# Patient Record
Sex: FEMALE | Race: WHITE
[De-identification: names, ages, dates, MRNs, and addresses within clinical notes are randomized per-mention and may not be internally consistent; named-entity substitution may affect disease eponyms.]

---

## 2018-10-30 ENCOUNTER — HOSPITAL ENCOUNTER (EMERGENCY)
Dept: HOSPITAL 80 - FED | Age: 63
Discharge: HOME | End: 2018-10-30
Payer: COMMERCIAL

## 2018-10-30 VITALS — DIASTOLIC BLOOD PRESSURE: 80 MMHG | SYSTOLIC BLOOD PRESSURE: 113 MMHG

## 2018-10-30 DIAGNOSIS — R07.89: Primary | ICD-10-CM

## 2018-10-30 LAB — PLATELET # BLD: 263 10^3/UL (ref 150–400)

## 2018-10-30 NOTE — CPEKG
Test Reason : OPEN

Blood Pressure : ***/*** mmHG

Vent. Rate : 098 BPM     Atrial Rate : 098 BPM

   P-R Int : 146 ms          QRS Dur : 075 ms

    QT Int : 346 ms       P-R-T Axes : 075 064 059 degrees

   QTc Int : 442 ms

 

Sinus rhythm

 

 

Confirmed by Albert Henley ([id number]) on 10/30/2018 2:35:47 PM

 

Referred By:             Confirmed By:Albert Henley

## 2018-10-30 NOTE — EDPHY
H & P


Time Seen by Provider: 10/30/18 14:09


HPI/ROS: 





CHIEF COMPLAINT:  Right-sided chest pain





HISTORY OF PRESENT ILLNESS:  63-year-old woman presents with right-sided chest 

pain starting this morning.  Located over her breast, worse with deep breath 

and worse lying down.  Not associated with coughing or leg swelling or fever or 

chills.  No recent injury or trauma.





Of note she has been seeing [facility] for bronchiectasis which was 

diagnosed when she had a persistent cough 2 years ago in [place].  She also 

started using albuterol saline nebs this past Saturday.  She also just returned 

from a trip to [place] this last week on Thursday





REVIEW OF SYSTEMS:


Eye: no change in vision


ENT: no sore throat


Cardiac:  HPI


Pulmonary: no cough or SOB


Abdomen: no vomiting, diarrhea, abdominal pain


Musculoskeletal:  No leg swelling


Skin: no rash


Neuro: no headache


Constitutional: no fever


: no urinary symptoms





A comprehensive 10 point review of systems is otherwise negative aside from 

elements mentioned in the history of present illness.





PAST MEDICAL HISTORY:  Bronchiectasis





Social history:  Nonsmoker





General Appearance: Alert and conversant, cooperative.


Eyes: No scleral  icterus. 


ENT, Mouth: Normal mucous membranes.


Respiratory: Normal respiratory effort, breath sounds equal, lungs are clear to 

auscultation.


Cardiovascular:  Regular rate and rhythm.


Gastrointestinal:  Abdomen is soft and non tender.


Neurological: Alert, face symmetric, normal motor and sensory in extremities. 


Skin: Warm and dry, no rashes.


Musculoskeletal:  No calf tenderness.


Psychiatric: Not agitated.





Emergency Department course/MDM:





Patient is noted to be tachycardic at a rate of 108. Plan for EKG and D-dimer, 

chest imaging:  CTA if elevated, x-ray if D-dimer negative.





Unlikely to be ACS or dissection or pneumonia.





1450:  The D-dimer negative, troponin negative.  Pulmonary embolism unlikely at 

this time, chest x-ray and discharge if negative with symptomatic treatment.


1520:  Chest x-ray shows granulomatous disease which is known previously, but 

no pneumothorax or other reason for acute right-sided chest pain, symptomatic 

treatment stable for discharge which the patient says she is comfortable with.


Smoking Status: Never smoked


Constitutional: 


 Initial Vital Signs











Temperature (C)  36.7 C   10/30/18 14:05


 


Heart Rate  108 H  10/30/18 14:05


 


Respiratory Rate  18   10/30/18 14:05


 


Blood Pressure  132/90 H  10/30/18 14:05


 


O2 Sat (%)  98   10/30/18 14:05








 











O2 Delivery Mode               Room Air














Allergies/Adverse Reactions: 


 





cefaclor [From Ceclor] Allergy (Verified 10/30/18 14:02)


 








Home Medications: 














 Medication  Instructions  Recorded


 


Albuterol  10/30/18


 


Baclofen  10/30/18


 


Estradiol  10/30/18


 


SUMAtriptan  10/30/18














Medical Decision Making





- Diagnostics


EKG Interpretation: 





12-lead EKG interpreted by me; official reading is in computer system.  My 

interpretation is sinus rhythm rate 99, no acute ST changes 


Imaging Results: 


 Imaging Impressions





Chest X-Ray  10/30/18 14:48


Impression: Focal infiltrates right mid to lower lung. In this patient with 

history of granulomatous disease, it is difficult to determine if these are 

acute versus chronic lesions. If indicated, would be helpful to obtain prior 

chest x-ray or CT chest imaging for comparison.


 











Imaging: I viewed and interpreted images myself


Differential Diagnosis: 





Differential diagnosis considered for chest pain including but not limited to 

myocardial ischemia, aortic dissection, pericarditis, pulmonary embolus, chest 

wall pain, pleural inflammation and pulmonary infectious causes.





- Data Points


Laboratory Results: 


 Laboratory Results





 10/30/18 14:25 





 10/30/18 14:25 





 











  10/30/18 10/30/18 10/30/18





  14:29 14:25 14:25


 


WBC      





    


 


RBC      





    


 


Hgb      





    


 


Hct      





    


 


MCV      





    


 


MCH      





    


 


MCHC      





    


 


RDW      





    


 


Plt Count      





    


 


MPV      





    


 


Neut % (Auto)      





    


 


Lymph % (Auto)      





    


 


Mono % (Auto)      





    


 


Eos % (Auto)      





    


 


Baso % (Auto)      





    


 


Nucleat RBC Rel Count      





    


 


Absolute Neuts (auto)      





    


 


Absolute Lymphs (auto)      





    


 


Absolute Monos (auto)      





    


 


Absolute Eos (auto)      





    


 


Absolute Basos (auto)      





    


 


Absolute Nucleated RBC      





    


 


Immature Gran %      





    


 


Immature Gran #      





    


 


D-Dimer      < 0.27 ug/mLFEU ug/mLFEU





     (0.00-0.50) 


 


Sodium    140 mEq/L mEq/L  





    (135-145)  


 


Potassium    3.9 mEq/L mEq/L  





    (3.3-5.0)  


 


Chloride    102 mEq/L mEq/L  





    ()  


 


Carbon Dioxide    29 mEq/l mEq/l  





    (22-31)  


 


Anion Gap    9 mEq/L mEq/L  





    (6-14)  


 


BUN    13 mg/dL mg/dL  





    (7-23)  


 


Creatinine    0.6 mg/dL mg/dL  





    (0.6-1.0)  


 


Estimated GFR    > 60   





    


 


Glucose    98 mg/dL mg/dL  





    ()  


 


Calcium    9.4 mg/dL mg/dL  





    (8.5-10.4)  


 


POC Troponin I  0.00 ng/mL ng/mL    





   (0.00-0.08)   














  10/30/18





  14:25


 


WBC  7.76 10^3/uL 10^3/uL





   (3.80-9.50) 


 


RBC  5.04 10^6/uL 10^6/uL





   (4.18-5.33) 


 


Hgb  14.4 g/dL g/dL





   (12.6-16.3) 


 


Hct  43.6 % %





   (38.0-47.0) 


 


MCV  86.5 fL fL





   (81.5-99.8) 


 


MCH  28.6 pg pg





   (27.9-34.1) 


 


MCHC  33.0 g/dL g/dL





   (32.4-36.7) 


 


RDW  13.2 % %





   (11.5-15.2) 


 


Plt Count  263 10^3/uL 10^3/uL





   (150-400) 


 


MPV  9.2 fL fL





   (8.7-11.7) 


 


Neut % (Auto)  69.3 % %





   (39.3-74.2) 


 


Lymph % (Auto)  18.4 % %





   (15.0-45.0) 


 


Mono % (Auto)  9.0 % %





   (4.5-13.0) 


 


Eos % (Auto)  2.1 % %





   (0.6-7.6) 


 


Baso % (Auto)  0.9 % %





   (0.3-1.7) 


 


Nucleat RBC Rel Count  0.0 % %





   (0.0-0.2) 


 


Absolute Neuts (auto)  5.38 10^3/uL 10^3/uL





   (1.70-6.50) 


 


Absolute Lymphs (auto)  1.43 10^3/uL 10^3/uL





   (1.00-3.00) 


 


Absolute Monos (auto)  0.70 10^3/uL 10^3/uL





   (0.30-0.80) 


 


Absolute Eos (auto)  0.16 10^3/uL 10^3/uL





   (0.03-0.40) 


 


Absolute Basos (auto)  0.07 10^3/uL 10^3/uL





   (0.02-0.10) 


 


Absolute Nucleated RBC  0.00 10^3/uL 10^3/uL





   (0-0.01) 


 


Immature Gran %  0.3 % %





   (0.0-1.1) 


 


Immature Gran #  0.02 10^3/uL 10^3/uL





   (0.00-0.10) 


 


D-Dimer  





  


 


Sodium  





  


 


Potassium  





  


 


Chloride  





  


 


Carbon Dioxide  





  


 


Anion Gap  





  


 


BUN  





  


 


Creatinine  





  


 


Estimated GFR  





  


 


Glucose  





  


 


Calcium  





  


 


POC Troponin I  





  











Point of Care Test Results: 


 Chemistry











  10/30/18





  14:29


 


POC Troponin I  0.00 ng/mL ng/mL





   (0.00-0.08) 














Departure





- Departure


Disposition: Home, Routine, Self-Care


Clinical Impression: 


Chest pain


Qualifiers:


 Chest pain type: unspecified Qualified Code(s): R07.9 - Chest pain, unspecified





Condition: Good


Instructions:  Chest Pain (ED)


Referrals: 


NONE *PRIMARY CARE P,. [Primary Care Provider] - As per Instructions ([employer] 

[employer], your usual care providers)


Lesly Pfeiffer MD [[facility] Primary Care Provider] - As per Instructions

## 2019-02-14 ENCOUNTER — HOSPITAL ENCOUNTER (OUTPATIENT)
Dept: HOSPITAL 80 - FIMAGING | Age: 64
End: 2019-02-14
Attending: INTERNAL MEDICINE
Payer: COMMERCIAL

## 2019-02-14 DIAGNOSIS — Z12.31: Primary | ICD-10-CM
